# Patient Record
Sex: FEMALE | Race: OTHER | URBAN - METROPOLITAN AREA
[De-identification: names, ages, dates, MRNs, and addresses within clinical notes are randomized per-mention and may not be internally consistent; named-entity substitution may affect disease eponyms.]

---

## 2020-02-27 ENCOUNTER — EMERGENCY (EMERGENCY)
Facility: HOSPITAL | Age: 50
LOS: 0 days | Discharge: HOME | End: 2020-02-27
Payer: COMMERCIAL

## 2020-02-27 VITALS
SYSTOLIC BLOOD PRESSURE: 123 MMHG | HEIGHT: 63 IN | HEART RATE: 73 BPM | OXYGEN SATURATION: 98 % | DIASTOLIC BLOOD PRESSURE: 79 MMHG | WEIGHT: 139.99 LBS | RESPIRATION RATE: 18 BRPM | TEMPERATURE: 99 F
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
## 2020-02-27 DIAGNOSIS — M54.9 DORSALGIA, UNSPECIFIED: ICD-10-CM
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
## 2020-02-27 PROCEDURE — 99283 EMERGENCY DEPT VISIT LOW MDM: CPT

## 2020-02-27 RX ORDER — IBUPROFEN 200 MG
600 TABLET ORAL ONCE
Refills: 0 | Status: COMPLETED | OUTPATIENT
Start: 2020-02-27 | End: 2020-02-27

## 2020-02-27 RX ORDER — CYCLOBENZAPRINE HYDROCHLORIDE 10 MG/1
1 TABLET, FILM COATED ORAL
Qty: 10 | Refills: 0
Start: 2020-02-27 | End: 2020-03-02

## 2020-02-27 RX ORDER — IBUPROFEN 200 MG
1 TABLET ORAL
Qty: 15 | Refills: 0
Start: 2020-02-27

## 2020-02-27 RX ADMIN — Medication 600 MILLIGRAM(S): at 20:39

## 2020-02-27 NOTE — ED PROVIDER NOTE - PATIENT PORTAL LINK FT
You can access the FollowMyHealth Patient Portal offered by [facility] by registering at the following website: [email or web address]. By joining [employer]’s FollowMyHealth portal, you will also be able to view your health information using other applications (apps) compatible with our system.

## 2020-02-27 NOTE — ED PROVIDER NOTE - MUSCULOSKELETAL, MLM
Spine appears normal, range of motion is not limited, no muscle or joint tenderness. MILD UPPER BACK TENDERNESS.

## 2020-02-27 NOTE — ED PROVIDER NOTE - CLINICAL SUMMARY MEDICAL DECISION MAKING FREE TEXT BOX
Discussed results and outcome of testing with the patient.  Patient advised to please follow up with their primary care doctor within the next 24-48 hours and return to the Emergency Department for worsening symptoms or any other concerns.  Patient advised that their doctor may call  to follow up on the specific results of the tests performed today in the emergency department.   Due to lack of trauma a fracture is unlikely for this patient. No imaging will be ordered at this time. Pt is instructed to do conservative management and f/u w a specialist in 2 days, re-evaluation and possible imaging at this point for persistent pain

## 2020-02-27 NOTE — ED PROVIDER NOTE - CHPI ED SYMPTOMS NEG
no bruising/no sleeping issues/no disorientation/no dizziness/no crying/no decreased eating/drinking/no difficulty bearing weight/no loss of consciousness/no laceration
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
## 2022-02-24 ENCOUNTER — EMERGENCY (EMERGENCY)
Facility: HOSPITAL | Age: 52
LOS: 0 days | Discharge: HOME | End: 2022-02-24
Attending: EMERGENCY MEDICINE
Payer: COMMERCIAL

## 2022-02-24 VITALS
SYSTOLIC BLOOD PRESSURE: 153 MMHG | OXYGEN SATURATION: 98 % | DIASTOLIC BLOOD PRESSURE: 87 MMHG | HEART RATE: 68 BPM | TEMPERATURE: 98 F | RESPIRATION RATE: 16 BRPM | WEIGHT: 147.05 LBS | HEIGHT: 63 IN
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
## 2022-02-24 DIAGNOSIS — Z20.822 CONTACT WITH AND (SUSPECTED) EXPOSURE TO COVID-19: ICD-10-CM
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
## 2022-02-24 DIAGNOSIS — M25.561 PAIN IN RIGHT KNEE: ICD-10-CM
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
## 2022-02-24 DIAGNOSIS — S09.90XA UNSPECIFIED INJURY OF HEAD, INITIAL ENCOUNTER: ICD-10-CM
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
## 2022-02-24 DIAGNOSIS — R51.9 HEADACHE, UNSPECIFIED: ICD-10-CM
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
## 2022-02-24 DIAGNOSIS — S80.01XA CONTUSION OF RIGHT KNEE, INITIAL ENCOUNTER: ICD-10-CM

## 2022-02-24 PROBLEM — Z78.9 OTHER SPECIFIED HEALTH STATUS: Chronic | Status: ACTIVE | Noted: 2020-02-27

## 2022-02-24 PROCEDURE — 99284 EMERGENCY DEPT VISIT MOD MDM: CPT

## 2022-02-24 PROCEDURE — 72125 CT NECK SPINE W/O DYE: CPT | Mod: 26,MA

## 2022-02-24 PROCEDURE — 70450 CT HEAD/BRAIN W/O DYE: CPT | Mod: 26,MA

## 2022-02-24 PROCEDURE — 73564 X-RAY EXAM KNEE 4 OR MORE: CPT | Mod: 26,RT

## 2022-02-24 RX ORDER — BACITRACIN ZINC 500 UNIT/G
1 OINTMENT IN PACKET (EA) TOPICAL ONCE
Refills: 0 | Status: DISCONTINUED | OUTPATIENT
Start: 2022-02-24 | End: 2022-02-24

## 2022-02-24 RX ORDER — IBUPROFEN 200 MG
600 TABLET ORAL ONCE
Refills: 0 | Status: DISCONTINUED | OUTPATIENT
Start: 2022-02-24 | End: 2022-02-24
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
## 2022-02-24 NOTE — ED ADULT NURSE NOTE - NSIMPLEMENTINTERV_GEN_ALL_ED
Implemented All Fall Risk Interventions:  [place] to call system. Call bell, personal items and telephone within reach. Instruct patient to call for assistance. Room bathroom lighting operational. Non-slip footwear when patient is off stretcher. Physically safe environment: no spills, clutter or unnecessary equipment. Stretcher in lowest position, wheels locked, appropriate side rails in place. Provide visual cue, wrist band, yellow gown, etc. Monitor gait and stability. Monitor for mental status changes and reorient to person, place, and time. Review medications for side effects contributing to fall risk. Reinforce activity limits and safety measures with patient and family.

## 2022-02-24 NOTE — ED PROVIDER NOTE - CLINICAL SUMMARY MEDICAL DECISION MAKING FREE TEXT BOX
Patient with forehead abrasion and hematoma s/p fall.  GCS 15.  Patient ambulatory without difficulty.  Will give medication for pain, check Xray for knee fracture; Check CT r/o ICH.

## 2022-02-24 NOTE — ED ADULT NURSE NOTE - OBJECTIVE STATEMENT
pt 50 y/o female c/c of fall, pt states she fell forward and hit her head. AAOX4, no neurological deficits noted. R forehead abrasion noted, scant red blood noted with ecchymosis. R knee ecchymosis noted. pt's son at bedside. pt took tylenol today. no PMH. pt denies chest pain, blurry vision, dizziness, sob, nausea.

## 2022-02-24 NOTE — ED ADULT TRIAGE NOTE - CHIEF COMPLAINT QUOTE
as per patient c/o pain and swelling to R forhead, s/t mechanical fall, pt was walking and tripped, hitting head on sidewalk. Abrasion and swelling noted to R side of abraham. Pt reports lightheadedness, denies changes in vision  and LOC.  Pt took Tylenol prior to arrival.

## 2022-02-24 NOTE — ED PROVIDER NOTE - OBJECTIVE STATEMENT
This patient is a 51 year old woman who presents to the ER c/o head pain s/p fall.  Patient states that she tripped and fell landing on her face.  No LOC.  No neck pain or vomiting.  She reports 7/10 pain at the right side of her forehead.  No vomiting.  She took tylenol just prior to arrival.  Patient also c/o right knee paain 6/10 in severity.

## 2022-02-24 NOTE — ED PROVIDER NOTE - PATIENT PORTAL LINK FT
You can access the FollowMyHealth Patient Portal offered by [facility] by registering at the following website: [email or web address]. By joining [employer]’s FollowMyHealth portal, you will also be able to view your health information using other applications (apps) compatible with our system.

## 2022-05-10 NOTE — ED ADULT NURSE NOTE - SUICIDE SCREENING QUESTION 2
The service to allergy order entered yesterday is missing info I requested, we need CPT code (s) for the allergy shots and office notes. The info on the order is for an OFFICE VISIT.      The order entered yesterday states:    Procedure Modifiers Provider Requested Approved  [address] None Segundo Pittman MD 1 1  [address] (CPTÂ®) - OFFICE OR OTHER OUTPT VISIT EST PT 30 TO 39 MINS MOD MDM LVL 4 None 1 1  Note    Wiergate Ramiro is referred to   Segundo Pittman MD   [address] [place] [address], [address] [place] [address]  Phone: [phone]  Fax: [phone]  Diagnosis Information  Diagnosis  V58.89 (ICD-9-CM) - T78.40XD (ICD-10-CM) - Allergy, subsequent encounter  Patient visits office monthly for allergy shots-5 shots No

## 2023-09-02 NOTE — ED PROVIDER NOTE - SKIN TEMP
Bed/Stretcher in lowest position, wheels locked, appropriate side rails in place/Call bell, personal items and telephone in reach/Instruct patient to call for assistance before getting out of bed/chair/stretcher/Non-slip footwear applied when patient is off stretcher/[place] to call system/Physically safe environment - no spills, clutter or unnecessary equipment/Purposeful proactive rounding/Room/bathroom lighting operational, light cord in reach
warm

## 2024-11-12 NOTE — ED ADULT NURSE NOTE - BRAND OF COVID-19 VACCINATION
I tried to process PA however, covermymeds in not currently working.     I called pharmacy to get Insurance information since we do not have an updated insurance card on file .     N: [id number]  BIN: [id number]  ID#: [id number]   Moderna dose 1 and 2

## 2024-12-20 NOTE — ED PROVIDER NOTE - CPE EDP MUSC NORM
Push fluids.   Recommend nasal rinses if purulent nasal drainage.   Flonase may help dry up nasal secretions.   Mucinex DM as needed.     Take the Augment with food.   Tylenol or ibuprofen as needed.   Steam, humidifier.   1 tsp honey for cough.     Follow up with PCP if not improving.     
normal...